# Patient Record
Sex: MALE | URBAN - METROPOLITAN AREA
[De-identification: names, ages, dates, MRNs, and addresses within clinical notes are randomized per-mention and may not be internally consistent; named-entity substitution may affect disease eponyms.]

---

## 2017-07-06 ENCOUNTER — TELEPHONE (OUTPATIENT)
Dept: PSYCHIATRY | Facility: CLINIC | Age: 32
End: 2017-07-06

## 2017-07-06 NOTE — TELEPHONE ENCOUNTER
Patient worried he might have been exposed to HIV.  Encouraged patient to call PCP and/or Infection Disease.  Patient very pleasant and thankful.  Suggested services at [facility]

## 2017-07-06 NOTE — TELEPHONE ENCOUNTER
----- Message from Edwige Kumar MA sent at 7/6/2017  1:09 PM CDT -----  Contact: pt  [phone]    Pt says he really needs you to call him soon. Says he would like you to call in a Rx for him. Says he feels he may have been exposed to a sexually transmitted virus resulting from an encounter he had last night would like to discuss with you further

## 2017-12-12 ENCOUNTER — TELEPHONE (OUTPATIENT)
Dept: HEPATOLOGY | Facility: CLINIC | Age: 32
End: 2017-12-12